# Patient Record
Sex: MALE | Race: WHITE | NOT HISPANIC OR LATINO
[De-identification: names, ages, dates, MRNs, and addresses within clinical notes are randomized per-mention and may not be internally consistent; named-entity substitution may affect disease eponyms.]

---

## 2018-06-12 ENCOUNTER — APPOINTMENT (OUTPATIENT)
Dept: INTERNAL MEDICINE | Facility: CLINIC | Age: 73
End: 2018-06-12
Payer: MEDICARE

## 2018-06-12 VITALS — BODY MASS INDEX: 28.87 KG/M2 | HEIGHT: 68 IN | WEIGHT: 190.5 LBS

## 2018-06-12 DIAGNOSIS — E78.5 HYPERLIPIDEMIA, UNSPECIFIED: ICD-10-CM

## 2018-06-12 PROCEDURE — 97802 MEDICAL NUTRITION INDIV IN: CPT

## 2018-06-12 RX ORDER — METOPROLOL TARTRATE 50 MG/1
50 TABLET, FILM COATED ORAL
Refills: 0 | Status: ACTIVE | COMMUNITY

## 2018-06-12 RX ORDER — ASPIRIN 81 MG
81 TABLET ORAL
Refills: 0 | Status: ACTIVE | COMMUNITY

## 2018-07-17 ENCOUNTER — APPOINTMENT (OUTPATIENT)
Dept: INTERNAL MEDICINE | Facility: CLINIC | Age: 73
End: 2018-07-17
Payer: MEDICARE

## 2018-07-17 VITALS — WEIGHT: 189.5 LBS | BODY MASS INDEX: 28.72 KG/M2 | HEIGHT: 68 IN

## 2018-07-17 PROCEDURE — 97803 MED NUTRITION INDIV SUBSEQ: CPT

## 2018-08-21 ENCOUNTER — APPOINTMENT (OUTPATIENT)
Dept: INTERNAL MEDICINE | Facility: CLINIC | Age: 73
End: 2018-08-21
Payer: MEDICARE

## 2018-08-21 PROCEDURE — 97803 MED NUTRITION INDIV SUBSEQ: CPT

## 2018-08-22 VITALS — WEIGHT: 188 LBS | HEIGHT: 68 IN | BODY MASS INDEX: 28.49 KG/M2

## 2018-09-24 ENCOUNTER — APPOINTMENT (OUTPATIENT)
Dept: INTERNAL MEDICINE | Facility: CLINIC | Age: 73
End: 2018-09-24
Payer: MEDICARE

## 2018-09-24 VITALS — BODY MASS INDEX: 28.87 KG/M2 | WEIGHT: 190.5 LBS | HEIGHT: 68 IN

## 2018-09-24 PROCEDURE — 97803 MED NUTRITION INDIV SUBSEQ: CPT

## 2018-11-05 ENCOUNTER — APPOINTMENT (OUTPATIENT)
Dept: INTERNAL MEDICINE | Facility: CLINIC | Age: 73
End: 2018-11-05
Payer: MEDICARE

## 2018-11-05 VITALS — WEIGHT: 189 LBS | HEIGHT: 68 IN | BODY MASS INDEX: 28.64 KG/M2

## 2018-11-05 PROCEDURE — 97803 MED NUTRITION INDIV SUBSEQ: CPT

## 2018-12-14 ENCOUNTER — APPOINTMENT (OUTPATIENT)
Dept: INTERNAL MEDICINE | Facility: CLINIC | Age: 73
End: 2018-12-14
Payer: SELF-PAY

## 2018-12-14 VITALS — BODY MASS INDEX: 29.01 KG/M2 | WEIGHT: 190.8 LBS

## 2018-12-14 DIAGNOSIS — E66.3 OVERWEIGHT: ICD-10-CM

## 2018-12-14 PROCEDURE — 00006N: CUSTOM

## 2018-12-26 ENCOUNTER — FORM ENCOUNTER (OUTPATIENT)
Age: 73
End: 2018-12-26

## 2018-12-27 ENCOUNTER — OUTPATIENT (OUTPATIENT)
Dept: OUTPATIENT SERVICES | Facility: HOSPITAL | Age: 73
LOS: 1 days | End: 2018-12-27
Payer: MEDICARE

## 2018-12-27 ENCOUNTER — APPOINTMENT (OUTPATIENT)
Dept: ORTHOPEDIC SURGERY | Facility: CLINIC | Age: 73
End: 2018-12-27
Payer: MEDICARE

## 2018-12-27 DIAGNOSIS — Z90.49 ACQUIRED ABSENCE OF OTHER SPECIFIED PARTS OF DIGESTIVE TRACT: Chronic | ICD-10-CM

## 2018-12-27 DIAGNOSIS — M16.11 UNILATERAL PRIMARY OSTEOARTHRITIS, RIGHT HIP: ICD-10-CM

## 2018-12-27 DIAGNOSIS — Z95.5 PRESENCE OF CORONARY ANGIOPLASTY IMPLANT AND GRAFT: Chronic | ICD-10-CM

## 2018-12-27 PROCEDURE — 73521 X-RAY EXAM HIPS BI 2 VIEWS: CPT | Mod: 26

## 2018-12-27 PROCEDURE — 99203 OFFICE O/P NEW LOW 30 MIN: CPT

## 2018-12-27 PROCEDURE — 73521 X-RAY EXAM HIPS BI 2 VIEWS: CPT

## 2019-01-16 ENCOUNTER — APPOINTMENT (OUTPATIENT)
Dept: ORTHOPEDIC SURGERY | Facility: CLINIC | Age: 74
End: 2019-01-16
Payer: MEDICARE

## 2019-01-16 DIAGNOSIS — M16.12 UNILATERAL PRIMARY OSTEOARTHRITIS, LEFT HIP: ICD-10-CM

## 2019-01-16 PROCEDURE — 99213 OFFICE O/P EST LOW 20 MIN: CPT

## 2019-01-29 VITALS — WEIGHT: 180 LBS | BODY MASS INDEX: 27.37 KG/M2

## 2019-01-29 NOTE — PHYSICAL EXAM
[de-identified] : General: NAOD, A0x3, Appropriately Dressed\par Skin: Warm and Dry, Normal Turgor, no rashes\par Neuro: AOx3, Cranial nerves grossly intact\par Psych: Mood and affect appropriate\par \par Right hip: No swelling edema erythema redness or drainage. Nontender. Range of motion: Hip flexion 110, abduction 30, extension 20, internal/external rotation 45. Pain presents throughout range of motion and at terminal flexion. Patient walks with a normal gait without devices.  [de-identified] : none today

## 2019-01-29 NOTE — HISTORY OF PRESENT ILLNESS
[de-identified] : Following up for a recheck. Has been to see in the spine team and has had been started on treatment there, he is doing very well his pain is greatly decreased from his most recent visit and is overall happy with the status of his pain at this time.

## 2019-01-29 NOTE — ASSESSMENT
[FreeTextEntry1] : Assessment\par #1 chronic back pain\par #2 moderate hip degenerative changes\par \par \par \par Plan\par #1 continue followup with her spine team\par #2 continue hip and knee strengthening program\par #3 followup with hip arthroplasty team pkennedy..

## 2019-02-21 VITALS — WEIGHT: 179 LBS | BODY MASS INDEX: 27.22 KG/M2

## 2019-03-14 VITALS — WEIGHT: 177 LBS | BODY MASS INDEX: 26.91 KG/M2

## 2019-05-17 VITALS — WEIGHT: 179 LBS | BODY MASS INDEX: 27.22 KG/M2

## 2019-08-19 ENCOUNTER — APPOINTMENT (OUTPATIENT)
Dept: OTOLARYNGOLOGY | Facility: CLINIC | Age: 74
End: 2019-08-19

## 2019-08-22 ENCOUNTER — APPOINTMENT (OUTPATIENT)
Dept: OTOLARYNGOLOGY | Facility: CLINIC | Age: 74
End: 2019-08-22

## 2019-11-11 ENCOUNTER — APPOINTMENT (OUTPATIENT)
Dept: INTERNAL MEDICINE | Facility: CLINIC | Age: 74
End: 2019-11-11
Payer: MEDICARE

## 2019-11-11 DIAGNOSIS — E11.9 TYPE 2 DIABETES MELLITUS W/OUT COMPLICATIONS: ICD-10-CM

## 2019-11-11 PROCEDURE — 97803 MED NUTRITION INDIV SUBSEQ: CPT

## 2019-11-12 VITALS — BODY MASS INDEX: 26.61 KG/M2 | WEIGHT: 175 LBS

## 2019-11-12 PROBLEM — E11.9 TYPE 2 DIABETES MELLITUS: Status: ACTIVE | Noted: 2018-06-12

## 2020-01-10 ENCOUNTER — RECORD ABSTRACTING (OUTPATIENT)
Age: 75
End: 2020-01-10

## 2020-01-10 DIAGNOSIS — Z78.9 OTHER SPECIFIED HEALTH STATUS: ICD-10-CM

## 2020-01-10 DIAGNOSIS — Z87.891 PERSONAL HISTORY OF NICOTINE DEPENDENCE: ICD-10-CM

## 2020-01-10 LAB
PSA FREE FLD-MCNC: 29
PSA FREE SERPL-MCNC: 1.2
PSA SERPL-MCNC: 4.2
TESTOST SERPL-MCNC: 226.4

## 2020-01-10 RX ORDER — HYDROCHLOROTHIAZIDE 25 MG/1
25 TABLET ORAL
Refills: 0 | Status: ACTIVE | COMMUNITY

## 2020-03-12 ENCOUNTER — APPOINTMENT (OUTPATIENT)
Dept: UROLOGY | Facility: CLINIC | Age: 75
End: 2020-03-12
Payer: MEDICARE

## 2020-03-12 VITALS
HEIGHT: 68 IN | HEART RATE: 67 BPM | WEIGHT: 175 LBS | BODY MASS INDEX: 26.52 KG/M2 | DIASTOLIC BLOOD PRESSURE: 70 MMHG | SYSTOLIC BLOOD PRESSURE: 105 MMHG

## 2020-03-12 DIAGNOSIS — Z81.8 FAMILY HISTORY OF OTHER MENTAL AND BEHAVIORAL DISORDERS: ICD-10-CM

## 2020-03-12 DIAGNOSIS — Z00.00 ENCOUNTER FOR GENERAL ADULT MEDICAL EXAMINATION W/OUT ABNORMAL FINDINGS: ICD-10-CM

## 2020-03-12 DIAGNOSIS — Z83.2 FAMILY HISTORY OF DISEASES OF THE BLOOD AND BLOOD-FORMING ORGANS AND CERTAIN DISORDERS INVOLVING THE IMMUNE MECHANISM: ICD-10-CM

## 2020-03-12 LAB
BILIRUB UR QL STRIP: NORMAL
CLARITY UR: CLEAR
COLLECTION METHOD: NORMAL
GLUCOSE UR-MCNC: NORMAL
HCG UR QL: 0.2 EU/DL
HGB UR QL STRIP.AUTO: NORMAL
KETONES UR-MCNC: NORMAL
LEUKOCYTE ESTERASE UR QL STRIP: NORMAL
NITRITE UR QL STRIP: NORMAL
PH UR STRIP: 53.5
PROT UR STRIP-MCNC: NORMAL
SP GR UR STRIP: 1.02

## 2020-03-12 PROCEDURE — 81003 URINALYSIS AUTO W/O SCOPE: CPT | Mod: QW

## 2020-03-12 PROCEDURE — 76857 US EXAM PELVIC LIMITED: CPT

## 2020-03-12 PROCEDURE — 99215 OFFICE O/P EST HI 40 MIN: CPT

## 2020-03-12 RX ORDER — EVOLOCUMAB 140 MG/ML
140 INJECTION, SOLUTION SUBCUTANEOUS
Refills: 0 | Status: ACTIVE | COMMUNITY

## 2020-03-12 RX ORDER — CLOPIDOGREL 75 MG/1
75 TABLET, FILM COATED ORAL
Refills: 0 | Status: DISCONTINUED | COMMUNITY
End: 2020-03-12

## 2020-03-12 RX ORDER — ERGOCALCIFEROL 1.25 MG/1
1.25 MG CAPSULE, LIQUID FILLED ORAL
Refills: 0 | Status: ACTIVE | COMMUNITY

## 2020-03-12 RX ORDER — B12/LEVOMEFOLATE CALCIUM/B-6 2-1.13-25
TABLET ORAL
Refills: 0 | Status: ACTIVE | COMMUNITY

## 2020-03-12 NOTE — LETTER BODY
[Dear  ___] : Dear  [unfilled], [Consult Letter:] : I had the pleasure of evaluating your patient, [unfilled]. [Please see my note below.] : Please see my note below. [Consult Closing:] : Thank you very much for allowing me to participate in the care of this patient.  If you have any questions, please do not hesitate to contact me. [DrBhanu  ___] : Dr. ALLEN [___] : [unfilled]

## 2020-03-12 NOTE — ASSESSMENT
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 I outlined behavioral modification with him in an effort to decrease the nocturia and rare episodes or severe urgency. The ejaculatory dysfunction is likely partially a consequence of the alpha blocker, which he will continue.\par \par I will call Dr. Franks with the PSA and look forward to seeing him in one year (amee, WILI). \par

## 2020-03-12 NOTE — HISTORY OF PRESENT ILLNESS
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  He presents with moderate lower urinary tract symptoms (mostly irritative) and nocturia x 3-4. Infrequently he experiences severe urgency without any pattern.  He is continuing on tamsulosin 0.4 mg daily.\par IPSS: 10/35\par Sono:  70cc PVR; 70cc prostate\par \par Dr. Franks had a bladder stone and underwent a laser cystolithotripsy in April 2016 (calcium oxalate).\par \par His erectile function is reported as normal, however he reports delayed ejaculation and decreased ejaculatory volume.\par \par PSAs:  9/5/18--4.2 (29%); 2/21/18--5.1;  2/5/18--4.1 (41%); 8/16--4.17; 3/15--2.97; 11/14--3.22.\par

## 2020-03-13 LAB — PSA SERPL-MCNC: 5.27 NG/ML

## 2021-03-26 ENCOUNTER — APPOINTMENT (OUTPATIENT)
Dept: UROLOGY | Facility: CLINIC | Age: 76
End: 2021-03-26
Payer: MEDICARE

## 2021-03-26 DIAGNOSIS — Z87.448 PERSONAL HISTORY OF OTHER DISEASES OF URINARY SYSTEM: ICD-10-CM

## 2021-03-26 DIAGNOSIS — I10 ESSENTIAL (PRIMARY) HYPERTENSION: ICD-10-CM

## 2021-03-26 LAB
BILIRUB UR QL STRIP: NORMAL
CLARITY UR: CLEAR
COLLECTION METHOD: NORMAL
GLUCOSE UR-MCNC: 1000
HCG UR QL: 0.2 EU/DL
HGB UR QL STRIP.AUTO: NORMAL
KETONES UR-MCNC: NORMAL
LEUKOCYTE ESTERASE UR QL STRIP: NORMAL
NITRITE UR QL STRIP: NORMAL
PH UR STRIP: 5.5
PROT UR STRIP-MCNC: NORMAL
SP GR UR STRIP: 1.02

## 2021-03-26 PROCEDURE — 99215 OFFICE O/P EST HI 40 MIN: CPT

## 2021-03-26 PROCEDURE — 76857 US EXAM PELVIC LIMITED: CPT

## 2021-03-26 PROCEDURE — 81003 URINALYSIS AUTO W/O SCOPE: CPT | Mod: QW

## 2021-03-26 RX ORDER — METFORMIN HYDROCHLORIDE 500 MG/1
500 TABLET, COATED ORAL
Refills: 0 | Status: ACTIVE | COMMUNITY

## 2021-03-26 RX ORDER — LOSARTAN POTASSIUM 100 MG/1
100 TABLET, FILM COATED ORAL
Refills: 0 | Status: ACTIVE | COMMUNITY

## 2021-03-26 RX ORDER — LORAZEPAM 0.5 MG/1
0.5 TABLET ORAL
Refills: 0 | Status: ACTIVE | COMMUNITY

## 2021-03-26 NOTE — ASSESSMENT
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  During his stable voiding symptoms, he will continue with the tamsulosin 0.4 mg..\par \par For the erectile dysfunction, I have provided Dr. Franks with a prescription for Cialis. I explained the possible side effects\par \par Providing that there are no new problems, I look forward to seeing Dr. Franks in 1 year (bladder sono, PSA).\par

## 2021-03-26 NOTE — PHYSICAL EXAM

## 2021-03-26 NOTE — ADDENDUM
[FreeTextEntry1] : A portion of this note was written by [Darryl Carcamo] on 03/23/2021 acting as a scribe for Dr. Barajas. \par \par I have personally reviewed the chart and agree that the record accurately reflects my personal performance of the history, physical exam, assessment, and plan.

## 2021-03-26 NOTE — HISTORY OF PRESENT ILLNESS
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  He presents with moderate lower urinary tract symptoms (mostly irritative) and nocturia x 3-4.  He experiences infrequent episodes of severe urgency without any pattern.  He is continuing on tamsulosin 0.4 mg daily.\par IPSS: 6/35\par Sono:  90cc PVR; Prostate 65cc\par \par Dr. Franks had a bladder stone and underwent a laser cystolithotripsy in April 2016 (calcium oxalate).\par \par His erectile function has worsened over the last year. \par \par PSAs:  3/2/21--4.6; 3/12/20--5.27; 9/5/18--4.2 (29%); 2/21/18--5.1;  2/5/18--4.1 (41%); 8/16--4.17; 3/15--2.97; 11/14--3.22

## 2021-03-29 LAB — BACTERIA UR CULT: NORMAL

## 2021-04-12 ENCOUNTER — NON-APPOINTMENT (OUTPATIENT)
Age: 76
End: 2021-04-12

## 2022-02-07 ENCOUNTER — NON-APPOINTMENT (OUTPATIENT)
Age: 77
End: 2022-02-07

## 2022-02-07 ENCOUNTER — APPOINTMENT (OUTPATIENT)
Dept: OTOLARYNGOLOGY | Facility: CLINIC | Age: 77
End: 2022-02-07
Payer: MEDICARE

## 2022-02-07 PROCEDURE — 31579 LARYNGOSCOPY TELESCOPIC: CPT

## 2022-02-07 PROCEDURE — 92524 BEHAVRAL QUALIT ANALYS VOICE: CPT | Mod: GN

## 2022-03-04 ENCOUNTER — APPOINTMENT (OUTPATIENT)
Dept: UROLOGY | Facility: CLINIC | Age: 77
End: 2022-03-04
Payer: MEDICARE

## 2022-03-04 VITALS
WEIGHT: 168 LBS | SYSTOLIC BLOOD PRESSURE: 107 MMHG | DIASTOLIC BLOOD PRESSURE: 70 MMHG | HEART RATE: 80 BPM | BODY MASS INDEX: 25.46 KG/M2 | HEIGHT: 68 IN | RESPIRATION RATE: 14 BRPM | TEMPERATURE: 98 F

## 2022-03-04 LAB
BILIRUB UR QL STRIP: NORMAL
CLARITY UR: CLEAR
COLLECTION METHOD: NORMAL
GLUCOSE UR-MCNC: NORMAL
HCG UR QL: 0.2 EU/DL
HGB UR QL STRIP.AUTO: NORMAL
KETONES UR-MCNC: NORMAL
LEUKOCYTE ESTERASE UR QL STRIP: NORMAL
NITRITE UR QL STRIP: NORMAL
PH UR STRIP: 5.5
PROT UR STRIP-MCNC: NORMAL
SP GR UR STRIP: 1.02

## 2022-03-04 PROCEDURE — 81003 URINALYSIS AUTO W/O SCOPE: CPT | Mod: QW

## 2022-03-04 PROCEDURE — 76857 US EXAM PELVIC LIMITED: CPT

## 2022-03-04 PROCEDURE — 99214 OFFICE O/P EST MOD 30 MIN: CPT

## 2022-03-04 RX ORDER — EMPAGLIFLOZIN 25 MG/1
TABLET, FILM COATED ORAL
Refills: 0 | Status: DISCONTINUED | COMMUNITY
End: 2022-03-04

## 2022-03-04 NOTE — ASSESSMENT
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  We will continue on tamsulosin 0.4 mg daily.\par \par The "stones" that he is describing may represent a discrete blood vessel at the base of the penis, dorsolaterally, or a small area of plaque, both of which are inconsequential. \par \par For the erectile dysfunction, I have provided Dr. Franks with a renewed prescription for Cialis. I encouraged him to try 1/2 20mg as this may be sufficient.\par \par Providing that there are no new problems, I look forward to seeing Dr. Franks in 1 year (bladder sono, PSA).\par

## 2022-03-04 NOTE — ADDENDUM
[FreeTextEntry1] : A portion of this note was written by [Darryl Carcamo] on 03/03/2022 acting as a scribe for Dr. Barajas. \par \par I have personally reviewed the chart and agree that the record accurately reflects my personal performance of the history, physical exam, assessment, and plan.

## 2022-03-04 NOTE — PHYSICAL EXAM

## 2022-03-04 NOTE — HISTORY OF PRESENT ILLNESS
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  He reports some "stones in his urethra," which are causing dysuria and urinary splaying. Dr. Franks had a bladder stone and underwent a laser cystolithotripsy in April 2016 (calcium oxalate).\par \par From his general urologic history, he reports moderate lower urinary tract symptoms (mostly irritative) and nocturia x 3-4.  He experiences infrequent episodes of severe urgency without any pattern.  He is continuing on tamsulosin 0.4 mg daily.\par IPSS: 4/35\par Sono:  105cc PVR; Prostate 75cc\par \par Dr. Franks has a >2 year history of erectile dysfunction. He has tried Cialis 20mg with some subjective improvement.  An additional problem is that he is unable to ejaculate.\par \par PSAs:  3/2/21--4.6; 3/12/20--5.27; 9/5/18--4.2 (29%); 2/21/18--5.1;  2/5/18--4.1 (41%); 8/16--4.17; 3/15--2.97; 11/14--3.22

## 2022-03-06 LAB — PSA SERPL-MCNC: 5.1 NG/ML

## 2022-03-07 ENCOUNTER — NON-APPOINTMENT (OUTPATIENT)
Age: 77
End: 2022-03-07

## 2022-09-01 ENCOUNTER — NON-APPOINTMENT (OUTPATIENT)
Age: 77
End: 2022-09-01

## 2022-10-31 ENCOUNTER — APPOINTMENT (OUTPATIENT)
Dept: UROLOGY | Facility: CLINIC | Age: 77
End: 2022-10-31

## 2022-10-31 VITALS
HEART RATE: 104 BPM | TEMPERATURE: 97 F | SYSTOLIC BLOOD PRESSURE: 113 MMHG | DIASTOLIC BLOOD PRESSURE: 74 MMHG | OXYGEN SATURATION: 97 %

## 2022-10-31 PROCEDURE — 76857 US EXAM PELVIC LIMITED: CPT

## 2022-10-31 PROCEDURE — 99215 OFFICE O/P EST HI 40 MIN: CPT

## 2022-10-31 RX ORDER — EVOLOCUMAB 140 MG/ML
140 INJECTION, SOLUTION SUBCUTANEOUS
Qty: 4 | Refills: 0 | Status: ACTIVE | COMMUNITY
Start: 2022-01-10

## 2022-10-31 RX ORDER — TAMSULOSIN HYDROCHLORIDE 0.4 MG/1
0.4 CAPSULE ORAL
Qty: 90 | Refills: 3 | Status: ACTIVE | COMMUNITY
Start: 1900-01-01 | End: 1900-01-01

## 2022-10-31 RX ORDER — SEMAGLUTIDE 1.34 MG/ML
2 INJECTION, SOLUTION SUBCUTANEOUS
Qty: 2 | Refills: 0 | Status: ACTIVE | COMMUNITY
Start: 2022-08-22

## 2022-10-31 NOTE — LETTER BODY
[Dear  ___] : Dear  [unfilled], [Consult Letter:] : I had the pleasure of evaluating your patient, [unfilled]. [Please see my note below.] : Please see my note below. [Consult Closing:] : Thank you very much for allowing me to participate in the care of this patient.  If you have any questions, please do not hesitate to contact me. [DrBhanu  ___] : Dr. ALLEN [DrBhanu ___] : Dr. ALLEN

## 2022-11-01 NOTE — ASSESSMENT
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  He is satisfied with his voiding pattern and will simply continue on tamsulosin 0.4 mg daily.  \par \par Similarly, Dr. Franks will continue on the Cialis 20 mg for the erectile dysfunction.\par \par Providing that there are no new problems, I look forward to seeing Dr. Franks in 1 year (bladder sono).  He will be due for a PSA in March 2023.\par

## 2022-11-01 NOTE — HISTORY OF PRESENT ILLNESS
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   He reports moderate stable lower urinary tract symptoms (mostly irritative) and nocturia x 3.  He experiences infrequent episodes of severe urgency without any pattern.  He is continuing on tamsulosin 0.4 mg daily.\par IPSS: 10/35\par Sono (performed to assess bladder emptying): PVR 37 cc, 80 cc prostate\par \par  Dr. Franks had a bladder stone and underwent a laser cystolithotripsy in April 2016 (calcium oxalate).\par \par Dr. Franks has a >2 year history of erectile dysfunction. He uses Cialis 20mg with a good result. An additional problem is that he is unable to ejaculate.\par \par PSAs:  3/4/22--5.1; 3/2/21--4.6; 3/12/20--5.27; 9/5/18--4.2 (29%); 2/21/18--5.1;  2/5/18--4.1 (41%); 8/16--4.17; 3/15--2.97; 11/14--3.22

## 2022-11-01 NOTE — ADDENDUM
[FreeTextEntry1] : A portion of this note was written by [Darryl Carcamo] on 10/28/2022 acting as a scribe for Dr. Barajas. \par \par I have personally reviewed the chart and agree that the record accurately reflects my personal performance of the history, physical exam, assessment, and plan.

## 2023-07-09 PROBLEM — R35.1 NOCTURIA: Status: ACTIVE | Noted: 2020-01-10

## 2023-07-09 PROBLEM — R97.20 ELEVATED PSA: Status: ACTIVE | Noted: 2020-03-11

## 2023-07-09 PROBLEM — R39.15 URINARY URGENCY: Status: ACTIVE | Noted: 2020-01-10

## 2023-07-09 PROBLEM — N43.40 SPERMATOCELE: Status: ACTIVE | Noted: 2020-03-11

## 2023-07-09 PROBLEM — Q54.9 HYPOSPADIAS IN MALE: Status: ACTIVE | Noted: 2020-01-10

## 2023-07-09 PROBLEM — R39.9 LOWER URINARY TRACT SYMPTOMS (LUTS): Status: ACTIVE | Noted: 2020-01-10

## 2023-07-09 PROBLEM — R33.9 URINARY RETENTION: Status: ACTIVE | Noted: 2020-01-10

## 2023-07-10 ENCOUNTER — APPOINTMENT (OUTPATIENT)
Dept: UROLOGY | Facility: CLINIC | Age: 78
End: 2023-07-10
Payer: MEDICARE

## 2023-07-10 VITALS
SYSTOLIC BLOOD PRESSURE: 109 MMHG | WEIGHT: 187 LBS | BODY MASS INDEX: 28.43 KG/M2 | HEART RATE: 59 BPM | OXYGEN SATURATION: 97 % | DIASTOLIC BLOOD PRESSURE: 66 MMHG

## 2023-07-10 VITALS
TEMPERATURE: 96.9 F | SYSTOLIC BLOOD PRESSURE: 137 MMHG | DIASTOLIC BLOOD PRESSURE: 79 MMHG | OXYGEN SATURATION: 97 % | HEART RATE: 108 BPM

## 2023-07-10 DIAGNOSIS — S86.019A STRAIN OF UNSPECIFIED ACHILLES TENDON, INITIAL ENCOUNTER: ICD-10-CM

## 2023-07-10 DIAGNOSIS — R39.15 URGENCY OF URINATION: ICD-10-CM

## 2023-07-10 DIAGNOSIS — R97.20 ELEVATED PROSTATE, SPECIFIC ANTIGEN [PSA]: ICD-10-CM

## 2023-07-10 DIAGNOSIS — N43.40 SPERMATOCELE OF EPIDIDYMIS, UNSPECIFIED: ICD-10-CM

## 2023-07-10 DIAGNOSIS — R35.1 NOCTURIA: ICD-10-CM

## 2023-07-10 DIAGNOSIS — R33.9 RETENTION OF URINE, UNSPECIFIED: ICD-10-CM

## 2023-07-10 DIAGNOSIS — R39.9 UNSPECIFIED SYMPTOMS AND SIGNS INVOLVING THE GENITOURINARY SYSTEM: ICD-10-CM

## 2023-07-10 DIAGNOSIS — Q54.9 HYPOSPADIAS, UNSPECIFIED: ICD-10-CM

## 2023-07-10 PROCEDURE — 51798 US URINE CAPACITY MEASURE: CPT

## 2023-07-10 PROCEDURE — 99215 OFFICE O/P EST HI 40 MIN: CPT

## 2023-07-10 NOTE — ASSESSMENT
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  Overall he is satisfied with his voiding pattern and will simply continue on tamsulosin 0.4 mg daily.  He is not interested in pursuing surgical options.\par \par Similarly, Dr. Franks will continue on the Cialis 20 mg for the erectile dysfunction.\par \par Providing that there are no new problems, I look forward to seeing Dr. Franks in 1 year (bladder sono).  He will send us his recent PSA.\par

## 2023-07-10 NOTE — HISTORY OF PRESENT ILLNESS
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  He reports moderate stable lower urinary tract symptoms (mostly irritative and exacerbated by cold) with nocturia x 3.  He experiences infrequent episodes of severe urgency without any pattern. He is continuing on tamsulosin 0.4 mg daily.\par IPSS: 11/3\par Sono (performed to assess bladder emptying): PVR 95 cc\par \par  Dr. Franks had a bladder stone and underwent a laser cystolithotripsy in April 2016 (calcium oxalate).\par \par Dr. Franks has a >2 year history of erectile dysfunction. He uses Cialis 20mg with a good result. An additional problem is that he is unable to ejaculate.\par \par PSAs:  3/4/22--5.1; 3/2/21--4.6; 3/12/20--5.27; 9/5/18--4.2 (29%); 2/21/18--5.1;  2/5/18--4.1 (41%); 8/16--4.17; 3/15--2.97; 11/14--3.22

## 2023-07-10 NOTE — PHYSICAL EXAM
[General Appearance - Well Developed] : well developed [General Appearance - Well Nourished] : well nourished [Normal Appearance] : normal appearance [Well Groomed] : well groomed [General Appearance - In No Acute Distress] : no acute distress [Abdomen Soft] : soft [Abdomen Tenderness] : non-tender [Abdomen Mass (___ Cm)] : no abdominal mass palpated [Abdomen Hernia] : no hernia was discovered [Costovertebral Angle Tenderness] : no ~M costovertebral angle tenderness [Urethral Meatus] : meatus normal [Penis Abnormality] : normal circumcised penis [Urinary Bladder Findings] : the bladder was normal on palpation [Scrotum] : the scrotum was normal [Testes Tenderness] : no tenderness of the testes [Prostate Tenderness] : the prostate was not tender [Testes Mass (___cm)] : there were no testicular masses [No Prostate Nodules] : no prostate nodules [Skin Color & Pigmentation] : normal skin color and pigmentation [Heart Rate And Rhythm] : Heart rate and rhythm were normal [Edema] : no peripheral edema [] : no respiratory distress [Respiration, Rhythm And Depth] : normal respiratory rhythm and effort [Exaggerated Use Of Accessory Muscles For Inspiration] : no accessory muscle use [Oriented To Time, Place, And Person] : oriented to person, place, and time [Affect] : the affect was normal [Mood] : the mood was normal [Not Anxious] : not anxious [Normal Station and Gait] : the gait and station were normal for the patient's age [No Focal Deficits] : no focal deficits [Inguinal Lymph Nodes Enlarged Bilaterally] : inguinal [FreeTextEntry1] : Left caput cyst (2cm). Glanular hypospadias. Mild scarring vs prominent calcified vessel on the proximal  right dorsolateral aspect of the penile shaft (vaguely palpable).

## 2023-07-12 ENCOUNTER — NON-APPOINTMENT (OUTPATIENT)
Age: 78
End: 2023-07-12

## 2023-07-12 LAB — BACTERIA UR CULT: NORMAL

## 2023-12-07 ENCOUNTER — APPOINTMENT (OUTPATIENT)
Dept: OPHTHALMOLOGY | Facility: CLINIC | Age: 78
End: 2023-12-07
Payer: MEDICARE

## 2023-12-07 ENCOUNTER — NON-APPOINTMENT (OUTPATIENT)
Age: 78
End: 2023-12-07

## 2023-12-07 PROCEDURE — 92004 COMPRE OPH EXAM NEW PT 1/>: CPT

## 2023-12-07 PROCEDURE — 92136 OPHTHALMIC BIOMETRY: CPT

## 2023-12-07 PROCEDURE — 92134 CPTRZ OPH DX IMG PST SGM RTA: CPT

## 2024-06-06 ENCOUNTER — NON-APPOINTMENT (OUTPATIENT)
Age: 79
End: 2024-06-06

## 2024-06-06 ENCOUNTER — APPOINTMENT (OUTPATIENT)
Dept: OPHTHALMOLOGY | Facility: CLINIC | Age: 79
End: 2024-06-06
Payer: MEDICARE

## 2024-06-06 PROCEDURE — 92012 INTRM OPH EXAM EST PATIENT: CPT

## 2024-06-27 DIAGNOSIS — N52.9 MALE ERECTILE DYSFUNCTION, UNSPECIFIED: ICD-10-CM

## 2024-08-25 ENCOUNTER — NON-APPOINTMENT (OUTPATIENT)
Age: 79
End: 2024-08-25

## 2024-08-26 ENCOUNTER — APPOINTMENT (OUTPATIENT)
Dept: UROLOGY | Facility: CLINIC | Age: 79
End: 2024-08-26
Payer: MEDICARE

## 2024-08-26 VITALS
WEIGHT: 167 LBS | HEART RATE: 107 BPM | BODY MASS INDEX: 25.39 KG/M2 | DIASTOLIC BLOOD PRESSURE: 73 MMHG | OXYGEN SATURATION: 97 % | SYSTOLIC BLOOD PRESSURE: 128 MMHG

## 2024-08-26 DIAGNOSIS — N43.40 SPERMATOCELE OF EPIDIDYMIS, UNSPECIFIED: ICD-10-CM

## 2024-08-26 DIAGNOSIS — N52.9 MALE ERECTILE DYSFUNCTION, UNSPECIFIED: ICD-10-CM

## 2024-08-26 DIAGNOSIS — R35.1 NOCTURIA: ICD-10-CM

## 2024-08-26 DIAGNOSIS — N21.0 CALCULUS IN BLADDER: ICD-10-CM

## 2024-08-26 DIAGNOSIS — R39.15 URGENCY OF URINATION: ICD-10-CM

## 2024-08-26 DIAGNOSIS — Q54.9 HYPOSPADIAS, UNSPECIFIED: ICD-10-CM

## 2024-08-26 DIAGNOSIS — Z87.898 PERSONAL HISTORY OF OTHER SPECIFIED CONDITIONS: ICD-10-CM

## 2024-08-26 DIAGNOSIS — R39.9 UNSPECIFIED SYMPTOMS AND SIGNS INVOLVING THE GENITOURINARY SYSTEM: ICD-10-CM

## 2024-08-26 DIAGNOSIS — I10 ESSENTIAL (PRIMARY) HYPERTENSION: ICD-10-CM

## 2024-08-26 DIAGNOSIS — Z87.448 PERSONAL HISTORY OF OTHER DISEASES OF URINARY SYSTEM: ICD-10-CM

## 2024-08-26 DIAGNOSIS — R33.9 RETENTION OF URINE, UNSPECIFIED: ICD-10-CM

## 2024-08-26 PROCEDURE — 76857 US EXAM PELVIC LIMITED: CPT

## 2024-08-26 PROCEDURE — 99215 OFFICE O/P EST HI 40 MIN: CPT

## 2024-08-26 RX ORDER — TADALAFIL 20 MG/1
20 TABLET ORAL
Qty: 30 | Refills: 3 | Status: ACTIVE | COMMUNITY
Start: 2024-08-26 | End: 1900-01-01

## 2024-08-26 NOTE — ASSESSMENT
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  I again reviewed behavioral modification and he will also continue on the tamsulosin 0.4 mg daily.  He is not interested in pursuing surgical options at this time.  He understands that the 2 bladder stones are tiny and would likely pass spontaneously.   Similarly, Dr. Franks will continue on the Cialis 20 mg for the erectile dysfunction.\  He understands that we do not follow PSAs in octogenarians.  Providing that there are no new problems, Dr. Franks should follow-up in 1 year (bladder sono).

## 2024-08-26 NOTE — PHYSICAL EXAM
[General Appearance - Well Developed] : well developed [General Appearance - Well Nourished] : well nourished [Normal Appearance] : normal appearance [Well Groomed] : well groomed [General Appearance - In No Acute Distress] : no acute distress [Abdomen Soft] : soft [Abdomen Tenderness] : non-tender [Abdomen Mass (___ Cm)] : no abdominal mass palpated [Abdomen Hernia] : no hernia was discovered [Costovertebral Angle Tenderness] : no ~M costovertebral angle tenderness [Urethral Meatus] : meatus normal [Penis Abnormality] : normal circumcised penis [Urinary Bladder Findings] : the bladder was normal on palpation [Scrotum] : the scrotum was normal [Testes Tenderness] : no tenderness of the testes [Testes Mass (___cm)] : there were no testicular masses [Prostate Tenderness] : the prostate was not tender [No Prostate Nodules] : no prostate nodules [Skin Color & Pigmentation] : normal skin color and pigmentation [Heart Rate And Rhythm] : Heart rate and rhythm were normal [Edema] : no peripheral edema [] : no respiratory distress [Respiration, Rhythm And Depth] : normal respiratory rhythm and effort [Exaggerated Use Of Accessory Muscles For Inspiration] : no accessory muscle use [Oriented To Time, Place, And Person] : oriented to person, place, and time [Affect] : the affect was normal [Mood] : the mood was normal [Not Anxious] : not anxious [Normal Station and Gait] : the gait and station were normal for the patient's age [No Focal Deficits] : no focal deficits [Inguinal Lymph Nodes Enlarged Bilaterally] : inguinal [FreeTextEntry1] : Left caput cyst (2cm). Glanular hypospadias. Mild scarring vs prominent calcified vessel on the proximal  right dorsolateral aspect of the penile shaft (vaguely palpable).

## 2024-08-26 NOTE — HISTORY OF PRESENT ILLNESS
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  He reports moderate stable lower urinary tract symptoms (mostly irritative and exacerbated by cold) with nocturia x 3.  He experiences infrequent episodes of severe urgency without any pattern. He is continuing on tamsulosin 0.4 mg daily.  He has been experiencing seeing occasional dysuria without hematuria or fever. IPSS: 11/3 Sono (performed to assess bladder emptying): 17cc PVR, 93cc prostate, 2 bladder stones (5+3mm)  Dr. Franks had a bladder stone and underwent a laser cystolithotripsy in April 2016 (calcium oxalate).  Dr. Franks has a >2 year history of erectile dysfunction. He uses Cialis 20mg with a good result. An additional problem is that he is unable to ejaculate.  PSAs:  3/4/22--5.1; 3/2/21--4.6; 3/12/20--5.27; 9/5/18--4.2 (29%); 2/21/18--5.1; 2/5/18--4.1 (41%); 8/16--4.17; 3/15--2.97; 11/14--3.22

## 2024-08-28 LAB — BACTERIA UR CULT: NORMAL

## 2024-09-10 RX ORDER — TADALAFIL 20 MG/1
20 TABLET ORAL
Qty: 30 | Refills: 11 | Status: ACTIVE | COMMUNITY
Start: 2024-09-10 | End: 1900-01-01

## 2025-02-07 ENCOUNTER — APPOINTMENT (OUTPATIENT)
Dept: OPHTHALMOLOGY | Facility: CLINIC | Age: 80
End: 2025-02-07
Payer: MEDICARE

## 2025-02-07 ENCOUNTER — NON-APPOINTMENT (OUTPATIENT)
Age: 80
End: 2025-02-07

## 2025-02-07 PROCEDURE — 92134 CPTRZ OPH DX IMG PST SGM RTA: CPT

## 2025-02-07 PROCEDURE — 92014 COMPRE OPH EXAM EST PT 1/>: CPT

## 2025-04-04 ENCOUNTER — APPOINTMENT (OUTPATIENT)
Dept: OPHTHALMOLOGY | Facility: CLINIC | Age: 80
End: 2025-04-04
Payer: MEDICARE

## 2025-04-04 ENCOUNTER — NON-APPOINTMENT (OUTPATIENT)
Age: 80
End: 2025-04-04

## 2025-04-04 PROCEDURE — 92012 INTRM OPH EXAM EST PATIENT: CPT

## 2025-04-23 ENCOUNTER — NON-APPOINTMENT (OUTPATIENT)
Age: 80
End: 2025-04-23

## 2025-04-23 ENCOUNTER — APPOINTMENT (OUTPATIENT)
Dept: OPHTHALMOLOGY | Facility: CLINIC | Age: 80
End: 2025-04-23
Payer: MEDICARE

## 2025-04-23 PROCEDURE — 99214 OFFICE O/P EST MOD 30 MIN: CPT

## 2025-04-23 PROCEDURE — 92083 EXTENDED VISUAL FIELD XM: CPT

## 2025-04-23 PROCEDURE — 92285 EXTERNAL OCULAR PHOTOGRAPHY: CPT

## 2025-06-23 ENCOUNTER — NON-APPOINTMENT (OUTPATIENT)
Age: 80
End: 2025-06-23

## 2025-06-23 ENCOUNTER — APPOINTMENT (OUTPATIENT)
Dept: OPHTHALMOLOGY | Facility: CLINIC | Age: 80
End: 2025-06-23
Payer: MEDICARE

## 2025-06-23 PROCEDURE — 92285 EXTERNAL OCULAR PHOTOGRAPHY: CPT

## 2025-06-23 PROCEDURE — 92083 EXTENDED VISUAL FIELD XM: CPT

## 2025-06-23 PROCEDURE — 99214 OFFICE O/P EST MOD 30 MIN: CPT

## 2025-06-24 ENCOUNTER — APPOINTMENT (OUTPATIENT)
Dept: OPHTHALMOLOGY | Facility: AMBULATORY SURGERY CENTER | Age: 80
End: 2025-06-24

## 2025-08-28 ENCOUNTER — APPOINTMENT (OUTPATIENT)
Dept: OPHTHALMOLOGY | Facility: AMBULATORY SURGERY CENTER | Age: 80
End: 2025-08-28

## 2025-08-28 ENCOUNTER — OUTPATIENT (OUTPATIENT)
Dept: OUTPATIENT SERVICES | Facility: HOSPITAL | Age: 80
LOS: 1 days | Discharge: HOME | End: 2025-08-28

## 2025-08-28 VITALS
DIASTOLIC BLOOD PRESSURE: 70 MMHG | RESPIRATION RATE: 16 BRPM | HEART RATE: 75 BPM | SYSTOLIC BLOOD PRESSURE: 141 MMHG | OXYGEN SATURATION: 97 %

## 2025-08-28 VITALS
RESPIRATION RATE: 16 BRPM | HEIGHT: 67 IN | TEMPERATURE: 98 F | WEIGHT: 153.22 LBS | OXYGEN SATURATION: 100 % | DIASTOLIC BLOOD PRESSURE: 74 MMHG | HEART RATE: 69 BPM | SYSTOLIC BLOOD PRESSURE: 127 MMHG

## 2025-08-28 DIAGNOSIS — Z90.49 ACQUIRED ABSENCE OF OTHER SPECIFIED PARTS OF DIGESTIVE TRACT: Chronic | ICD-10-CM

## 2025-08-28 DIAGNOSIS — Z95.5 PRESENCE OF CORONARY ANGIOPLASTY IMPLANT AND GRAFT: Chronic | ICD-10-CM

## 2025-08-28 PROCEDURE — 67903 REPAIR EYELID DEFECT: CPT | Mod: 50

## 2025-08-28 DEVICE — SURGIFOAM 8 X 12.5CM X 10MM (100): Type: IMPLANTABLE DEVICE | Site: BILATERAL | Status: FUNCTIONAL

## 2025-08-28 DEVICE — SET PTOSIS SLING: Type: IMPLANTABLE DEVICE | Site: BILATERAL | Status: FUNCTIONAL

## 2025-08-28 RX ORDER — TADALAFIL 20 MG/1
1 TABLET, FILM COATED ORAL
Refills: 0 | DISCHARGE

## 2025-08-28 RX ORDER — TAMSULOSIN HYDROCHLORIDE 0.4 MG/1
1 CAPSULE ORAL
Refills: 0 | DISCHARGE

## 2025-08-28 RX ORDER — METFORMIN HYDROCHLORIDE 850 MG/1
1 TABLET ORAL
Refills: 0 | DISCHARGE

## 2025-08-28 RX ORDER — ONDANSETRON HCL/PF 4 MG/2 ML
4 VIAL (ML) INJECTION ONCE
Refills: 0 | Status: DISCONTINUED | OUTPATIENT
Start: 2025-08-28 | End: 2025-08-28

## 2025-08-28 RX ORDER — OXYCODONE HYDROCHLORIDE 30 MG/1
5 TABLET ORAL ONCE
Refills: 0 | Status: DISCONTINUED | OUTPATIENT
Start: 2025-08-28 | End: 2025-08-28

## 2025-08-28 RX ORDER — ACETAMINOPHEN 500 MG/5ML
650 LIQUID (ML) ORAL EVERY 6 HOURS
Refills: 0 | Status: DISCONTINUED | OUTPATIENT
Start: 2025-08-28 | End: 2025-08-28

## 2025-08-28 RX ORDER — FENTANYL CITRATE-0.9 % NACL/PF 100MCG/2ML
25 SYRINGE (ML) INTRAVENOUS
Refills: 0 | Status: DISCONTINUED | OUTPATIENT
Start: 2025-08-28 | End: 2025-08-28

## 2025-08-28 RX ORDER — SODIUM CHLORIDE 9 G/1000ML
500 INJECTION, SOLUTION INTRAVENOUS
Refills: 0 | Status: DISCONTINUED | OUTPATIENT
Start: 2025-08-28 | End: 2025-08-28

## 2025-08-28 RX ORDER — ACETAMINOPHEN 500 MG/5ML
2 LIQUID (ML) ORAL
Qty: 0 | Refills: 0 | DISCHARGE
Start: 2025-08-28

## 2025-08-28 RX ORDER — INCLISIRAN 284 MG/1.5ML
284 INJECTION, SOLUTION SUBCUTANEOUS
Refills: 0 | DISCHARGE

## 2025-08-28 RX ORDER — AMLODIPINE BESYLATE 10 MG/1
1 TABLET ORAL
Refills: 0 | DISCHARGE

## 2025-09-04 ENCOUNTER — APPOINTMENT (OUTPATIENT)
Dept: OPHTHALMOLOGY | Facility: CLINIC | Age: 80
End: 2025-09-04

## 2025-09-04 ENCOUNTER — NON-APPOINTMENT (OUTPATIENT)
Age: 80
End: 2025-09-04

## (undated) DEVICE — STRYKER COLORADO N-SERIES 3CM STRAIGHT

## (undated) DEVICE — SUT VICRYL 4-0 18" P-3 UNDYED

## (undated) DEVICE — SUT CHROMIC 4-0 27" RB-1

## (undated) DEVICE — APPLICATOR COTTON TIP 6"

## (undated) DEVICE — Device

## (undated) DEVICE — SUT SILK 6-0 18" G-1

## (undated) DEVICE — BIPOLAR FORCEP KIRWAN JEWELERS STR 4" X 0.4MM W 12FT CORD (GREEN)

## (undated) DEVICE — NDL HYPO REGULAR BEVEL 30G X 1" (BEIGE)

## (undated) DEVICE — SUT POLYDEK  4-0 18" ME-2

## (undated) DEVICE — VESSEL LOOP EXTRA MAXI-WHITE 0.200" X 22"

## (undated) DEVICE — DRSG TAPE MICROFOAM 1"

## (undated) DEVICE — SUT PROLENE 6-0 18" C-1

## (undated) DEVICE — SUT SILK 4-0 18" G-3

## (undated) DEVICE — SUT PLAIN GUT 6-0 18" G-1

## (undated) DEVICE — SUT VICRYL 6-0 18" P-3 UNDYED

## (undated) DEVICE — PETRI DISH MED 3.5"